# Patient Record
Sex: FEMALE | Race: OTHER | NOT HISPANIC OR LATINO
[De-identification: names, ages, dates, MRNs, and addresses within clinical notes are randomized per-mention and may not be internally consistent; named-entity substitution may affect disease eponyms.]

---

## 2021-02-15 ENCOUNTER — FORM ENCOUNTER (OUTPATIENT)
Age: 66
End: 2021-02-15

## 2021-02-16 ENCOUNTER — TRANSCRIPTION ENCOUNTER (OUTPATIENT)
Age: 66
End: 2021-02-16

## 2021-02-17 ENCOUNTER — APPOINTMENT (OUTPATIENT)
Dept: DISASTER EMERGENCY | Facility: HOSPITAL | Age: 66
End: 2021-02-17

## 2021-02-17 PROBLEM — Z00.00 ENCOUNTER FOR PREVENTIVE HEALTH EXAMINATION: Status: ACTIVE | Noted: 2021-02-17

## 2021-02-18 ENCOUNTER — TRANSCRIPTION ENCOUNTER (OUTPATIENT)
Age: 66
End: 2021-02-18

## 2021-02-20 ENCOUNTER — OUTPATIENT (OUTPATIENT)
Dept: OUTPATIENT SERVICES | Facility: HOSPITAL | Age: 66
LOS: 1 days | End: 2021-02-20
Payer: MEDICAID

## 2021-02-20 ENCOUNTER — APPOINTMENT (OUTPATIENT)
Dept: DISASTER EMERGENCY | Facility: HOSPITAL | Age: 66
End: 2021-02-20

## 2021-02-20 VITALS
OXYGEN SATURATION: 100 % | RESPIRATION RATE: 18 BRPM | SYSTOLIC BLOOD PRESSURE: 124 MMHG | HEART RATE: 62 BPM | DIASTOLIC BLOOD PRESSURE: 78 MMHG | WEIGHT: 145.06 LBS | TEMPERATURE: 97 F | HEIGHT: 60 IN

## 2021-02-20 VITALS
TEMPERATURE: 98 F | SYSTOLIC BLOOD PRESSURE: 134 MMHG | DIASTOLIC BLOOD PRESSURE: 81 MMHG | OXYGEN SATURATION: 97 % | RESPIRATION RATE: 18 BRPM | HEART RATE: 60 BPM

## 2021-02-20 DIAGNOSIS — U07.1 COVID-19: ICD-10-CM

## 2021-02-20 PROCEDURE — M0239: CPT

## 2021-02-20 RX ORDER — BAMLANIVIMAB 35 MG/ML
700 INJECTION, SOLUTION INTRAVENOUS ONCE
Refills: 0 | Status: COMPLETED | OUTPATIENT
Start: 2021-02-20 | End: 2021-02-20

## 2021-02-20 RX ORDER — SODIUM CHLORIDE 9 MG/ML
250 INJECTION INTRAMUSCULAR; INTRAVENOUS; SUBCUTANEOUS
Refills: 0 | Status: DISCONTINUED | OUTPATIENT
Start: 2021-02-20 | End: 2021-03-07

## 2021-02-20 RX ADMIN — BAMLANIVIMAB 270 MILLIGRAM(S): 35 INJECTION, SOLUTION INTRAVENOUS at 13:21

## 2021-02-20 RX ADMIN — SODIUM CHLORIDE 25 MILLILITER(S): 9 INJECTION INTRAMUSCULAR; INTRAVENOUS; SUBCUTANEOUS at 13:21

## 2021-02-20 NOTE — MONOCLONAL ANTIBODY INFUSION - ASSESSMENT AND PLAN
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 Olivera who presents to infusion center for Monoclonal antibody infusion (bamlanivimab)  Symptoms/ Criteria: fever, cough, loss taste/smell  Risk Profile includes:     PLAN:  - Infusion procedure explained to patient   - Consent for monoclonal antibody infusion obtained and placed in patient's bedside chart  - Risk and benefits discussed with the patient and all questions were answered  - Infuse bamlanivimab  IV over one hour  - Check vital signs immediately prior to starting infusion and then every 15 minutes while infusion is running   - Observe patient for one hour post infusion      I have reviewed the bamlanivimab Emergency Use Authorization (EUA) and I have provided the patient or patient's caregiver with the following information:    1. FDA has authorized emergency use  bamlanivimab , which is not an FDA-approved biological product.  2. The patient or patient's caregiver has the option to accept or refuse administration of  bamlanivimab.   3. The significant known and potential risks and benefits of  bamlanivimab  and the extent to which such risks and benefits are unknown.  4. Information on available alternative treatments and risks and benefits of those alternatives.  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 Olivera who presents to infusion center for Monoclonal antibody infusion (bamlanivimab)  Symptoms/ Criteria: fever, cough, loss taste/smell  Risk Profile includes:     PLAN:  - Infusion procedure explained to patient   - Consent for monoclonal antibody infusion obtained and placed in patient's bedside chart  - Risk and benefits discussed with the patient and all questions were answered  - Infuse bamlanivimab  IV over one hour  - Check vital signs immediately prior to starting infusion and then every 15 minutes while infusion is running   - Observe patient for one hour post infusion      I have reviewed the bamlanivimab Emergency Use Authorization (EUA) and I have provided the patient or patient's caregiver with the following information:    1. FDA has authorized emergency use  bamlanivimab , which is not an FDA-approved biological product.  2. The patient or patient's caregiver has the option to accept or refuse administration of  bamlanivimab.   3. The significant known and potential risks and benefits of  bamlanivimab  and the extent to which such risks and benefits are unknown.  4. Information on available alternative treatments and risks and benefits of those alternatives.     Addendum:  Patient is cleared for discharge.  She tolerated full infusion well and denies complaints of chest pain, shortness of breath, nausea/vomiting, dizziness, or palpitations. Vital signs stable upon discharge. Patient is medically stable and cleared for discharge home. Discharge instructions provided to patient with fact sheet included. Patient was instructed to continue to self-isolate and use  infection control measures (e.g., wear mask, isolate, social distance, avoid sharing personal items, clean and disinfect “high touch” surfaces, and frequent handwashing) according to CDC guidelines. Patient instructed to follow up with PMD as needed.

## 2021-02-20 NOTE — MONOCLONAL ANTIBODY INFUSION - EXAM
CC: Monoclonal Antibody Infusion/COVID 19 Positive  65yFemale    exam/findings:  T(C): 36.3 (02-20-21 @ 12:57), Max: 36.3 (02-20-21 @ 12:57)  HR: 62 (02-20-21 @ 12:57) (62 - 62)  BP: 124/78 (02-20-21 @ 12:57) (124/78 - 124/78)  RR: 18 (02-20-21 @ 12:57) (18 - 18)  SpO2: 100% (02-20-21 @ 12:57) (100% - 100%)      PE:   Appearance: NAD	,   HEENT:   Normal oral mucosa,   Lymphatic: No lymphadenopathy  Cardiovascular: RRR  Respiratory: Lungs clear to auscultation	  Gastrointestinal:  Soft, Non-tender, + BS	  Skin: warm and dry, no rash  Neurologic: A&O x 3  Extremities: Neg edema             Son at bedside as /caregiver  CC: Monoclonal Antibody Infusion/COVID 19 Positive  65yFemale    exam/findings:  T(C): 36.3 (02-20-21 @ 12:57), Max: 36.3 (02-20-21 @ 12:57)  HR: 62 (02-20-21 @ 12:57) (62 - 62)  BP: 124/78 (02-20-21 @ 12:57) (124/78 - 124/78)  RR: 18 (02-20-21 @ 12:57) (18 - 18)  SpO2: 100% (02-20-21 @ 12:57) (100% - 100%)      PE:   Appearance: NAD	,   HEENT:   Normal oral mucosa,   Lymphatic: No lymphadenopathy  Cardiovascular: RRR  Respiratory: Lungs clear to auscultation	  Gastrointestinal:  Soft, Non-tender, + BS	  Skin: warm and dry, no rash  Neurologic: A&O x 3  Extremities: Neg edema

## 2021-02-21 ENCOUNTER — TRANSCRIPTION ENCOUNTER (OUTPATIENT)
Age: 66
End: 2021-02-21